# Patient Record
Sex: FEMALE | Race: WHITE | HISPANIC OR LATINO | Employment: PART TIME | URBAN - METROPOLITAN AREA
[De-identification: names, ages, dates, MRNs, and addresses within clinical notes are randomized per-mention and may not be internally consistent; named-entity substitution may affect disease eponyms.]

---

## 2017-02-06 ENCOUNTER — ALLSCRIPTS OFFICE VISIT (OUTPATIENT)
Dept: OTHER | Facility: OTHER | Age: 37
End: 2017-02-06

## 2017-06-14 ENCOUNTER — ALLSCRIPTS OFFICE VISIT (OUTPATIENT)
Dept: OTHER | Facility: OTHER | Age: 37
End: 2017-06-14

## 2017-06-14 DIAGNOSIS — N64.52 NIPPLE DISCHARGE: ICD-10-CM

## 2017-06-14 LAB — HCG, QUALITATIVE (HISTORICAL): NEGATIVE

## 2017-07-07 ENCOUNTER — LAB CONVERSION - ENCOUNTER (OUTPATIENT)
Dept: OTHER | Facility: OTHER | Age: 37
End: 2017-07-07

## 2017-07-07 LAB
PROLACTIN (HISTORICAL): 4.7 NG/ML
TSH SERPL DL<=0.05 MIU/L-ACNC: 1.14 MIU/L

## 2017-07-12 ENCOUNTER — HOSPITAL ENCOUNTER (OUTPATIENT)
Dept: ULTRASOUND IMAGING | Facility: CLINIC | Age: 37
Discharge: HOME | End: 2017-07-12
Payer: COMMERCIAL

## 2017-07-12 ENCOUNTER — APPOINTMENT (OUTPATIENT)
Dept: ULTRASOUND IMAGING | Facility: CLINIC | Age: 37
End: 2017-07-12
Payer: COMMERCIAL

## 2017-07-12 ENCOUNTER — HOSPITAL ENCOUNTER (OUTPATIENT)
Dept: MAMMOGRAPHY | Facility: CLINIC | Age: 37
Discharge: HOME | End: 2017-07-12
Payer: COMMERCIAL

## 2017-07-12 DIAGNOSIS — N64.52 NIPPLE DISCHARGE: ICD-10-CM

## 2017-07-12 PROCEDURE — 76642 ULTRASOUND BREAST LIMITED: CPT

## 2017-07-12 PROCEDURE — G0204 DX MAMMO INCL CAD BI: HCPCS

## 2017-07-12 PROCEDURE — G0279 TOMOSYNTHESIS, MAMMO: HCPCS

## 2018-01-10 NOTE — MISCELLANEOUS
Message  pc from pt c/o incision being red, spoke with nurse Ashok Nissen  She recommended for pt to go to er and have her incision evaluate  Active Problems    1  BMI 45 0-49 9, adult (V85 42) (Z68 42)   2  Encounter for routine gynecological examination (V72 31) (Z01 419)   3  Encounter for tubal ligation (V25 2) (Z30 2)   4  Preoperative exam for gynecologic surgery (V72 83) (Z01 818)   5  Screening for STD (sexually transmitted disease) (V74 5) (Z11 3)    Current Meds   1  No Reported Medications Recorded    Allergies    1   No Known Drug Allergies    Signatures   Electronically signed by : Baylee Alvarez, ; Apr 15 2016 11:38AM EST                       (Author)

## 2018-01-13 VITALS
SYSTOLIC BLOOD PRESSURE: 122 MMHG | WEIGHT: 228 LBS | DIASTOLIC BLOOD PRESSURE: 62 MMHG | HEIGHT: 64 IN | HEART RATE: 85 BPM | BODY MASS INDEX: 38.93 KG/M2

## 2018-01-13 VITALS
DIASTOLIC BLOOD PRESSURE: 62 MMHG | TEMPERATURE: 97.7 F | HEART RATE: 80 BPM | HEIGHT: 64 IN | WEIGHT: 224.43 LBS | SYSTOLIC BLOOD PRESSURE: 104 MMHG | BODY MASS INDEX: 38.32 KG/M2

## 2018-03-07 NOTE — PROGRESS NOTES
Discussion/Summary    Patient reviewed by surgical committee  Approved for bilateral tubal ligation via fulguration and removal of Implanon implant  Signatures   Electronically signed by :  JOSE Silva ; Mar 22 2016 12:09PM EST                       (Author)

## 2018-09-24 ENCOUNTER — TRANSCRIBE ORDERS (OUTPATIENT)
Dept: ADMINISTRATIVE | Facility: HOSPITAL | Age: 38
End: 2018-09-24

## 2018-09-24 DIAGNOSIS — IMO0002 LUMP: Primary | ICD-10-CM

## 2018-10-01 ENCOUNTER — HOSPITAL ENCOUNTER (OUTPATIENT)
Dept: MAMMOGRAPHY | Facility: CLINIC | Age: 38
Discharge: HOME | End: 2018-10-01
Payer: COMMERCIAL

## 2018-10-01 ENCOUNTER — TELEPHONE (OUTPATIENT)
Dept: MAMMOGRAPHY | Facility: CLINIC | Age: 38
End: 2018-10-01

## 2018-10-01 ENCOUNTER — HOSPITAL ENCOUNTER (OUTPATIENT)
Dept: ULTRASOUND IMAGING | Facility: CLINIC | Age: 38
Discharge: HOME | End: 2018-10-01
Payer: COMMERCIAL

## 2018-10-01 DIAGNOSIS — IMO0002 LUMP: ICD-10-CM

## 2018-10-01 PROCEDURE — G0279 TOMOSYNTHESIS, MAMMO: HCPCS

## 2018-10-01 PROCEDURE — 76642 ULTRASOUND BREAST LIMITED: CPT

## 2018-10-01 PROCEDURE — 77066 DX MAMMO INCL CAD BI: CPT

## 2018-10-02 DIAGNOSIS — N64.52 NIPPLE DISCHARGE: Primary | ICD-10-CM

## 2018-10-09 ENCOUNTER — OFFICE VISIT (OUTPATIENT)
Dept: OBGYN CLINIC | Facility: HOSPITAL | Age: 38
End: 2018-10-09

## 2018-10-09 VITALS
HEART RATE: 97 BPM | DIASTOLIC BLOOD PRESSURE: 85 MMHG | HEIGHT: 64 IN | BODY MASS INDEX: 38.76 KG/M2 | WEIGHT: 227 LBS | SYSTOLIC BLOOD PRESSURE: 132 MMHG

## 2018-10-09 DIAGNOSIS — Z01.419 WOMEN'S ANNUAL ROUTINE GYNECOLOGICAL EXAMINATION: Primary | ICD-10-CM

## 2018-10-09 PROBLEM — N64.52 DISCHARGE FROM NIPPLE: Status: ACTIVE | Noted: 2017-06-14

## 2018-10-09 PROCEDURE — 87624 HPV HI-RISK TYP POOLED RSLT: CPT | Performed by: NURSE PRACTITIONER

## 2018-10-09 PROCEDURE — G0145 SCR C/V CYTO,THINLAYER,RESCR: HCPCS | Performed by: NURSE PRACTITIONER

## 2018-10-09 PROCEDURE — 99395 PREV VISIT EST AGE 18-39: CPT | Performed by: NURSE PRACTITIONER

## 2018-10-09 RX ORDER — FLUTICASONE PROPIONATE 50 MCG
1-2 SPRAY, SUSPENSION (ML) NASAL DAILY
COMMUNITY
Start: 2016-04-27

## 2018-10-09 NOTE — PATIENT INSTRUCTIONS
Breast Self Exam for Women   WHAT YOU NEED TO KNOW:   What is a breast self-exam (BSE)? A BSE is a way to check your breasts for lumps and other changes  Regular BSEs can help you know how your breasts normally look and feel  Most breast lumps or changes are not cancer, but you should always have them checked by a healthcare provider  Your healthcare provider can also watch you do a BSE and can tell you if you are doing your BSE correctly  Why should I do a BSE? Breast cancer is the most common type of cancer in women  Even if you have mammograms, you may still want to do a BSE regularly  If you know how your breasts normally feel and look, it may help you know when to contact your healthcare provider  Mammograms can miss some cancers  You may find a lump during a BSE that did not show up on your mammogram   When should I do a BSE? Salty your calendar to help you remember to do BSE on a regular schedule  One easy way to remember to do a BSE is to do the exam on the same day of each month  If you have periods, you may want to do your BSE 1 week after your period ends  This is the time when your breasts may be the least swollen, lumpy, or tender  You can do regular BSEs even if you are breastfeeding or have breast implants  How should I do a BSE? · Look at your breasts in a mirror  Look at the size and shape of each breast and nipple  Check for swelling, lumps, dimpling, scaly skin, or other skin changes  Look for nipple changes, such as a nipple that is painful or beginning to pull inward  Gently squeeze both nipples and check to see if fluid (that is not breast milk) comes out of them  If you find any of these or other breast changes, contact your healthcare provider  Check your breasts while you sit or  the following 3 positions:    [facility] your arms down at your sides  ¨ Raise your hands and join them behind your head  ¨ Put firm pressure with your hands on your hips   Bend slightly forward while you look at your breasts in the mirror  · Lie down and feel your breasts  When you lie down, your breast tissue spreads out evenly over your chest  This makes it easier for you to feel for lumps and anything that may not be normal for your breasts  Do a BSE on one breast at a time  ¨ Place a small pillow or towel under your left shoulder  Put your left arm behind your head  ¨ Use the 3 middle fingers of your right hand  Use your fingertip pads, on the top of your fingers  Your fingertip pad is the most sensitive part of your finger  ¨ Use small circles to feel your breast tissue  Use your fingertip pads to make dime-sized, overlapping circles on your breast and armpits  Use light, medium, and firm pressure  First, press lightly  Second, press with medium pressure to feel a little deeper into the breast  Last, use firm pressure to feel deep within your breast     ¨ Examine your entire breast area  Examine the breast area from above the breast to below the breast where you feel only ribs  Make small circles with your fingertips, starting in the middle of your armpit  Make circles going up and down the breast area  Continue toward your breast and all the way across it  Examine the area from your armpit all the way over to the middle of your chest (breastbone)  Stop at the middle of your chest     ¨ Move the pillow or towel to your right shoulder, and put your right arm behind your head  Use the 3 fingertip pads of your left hand, and repeat the above steps to do a BSE on your right breast        What else can I do to check for breast problems or cancer? Some experts suggest that women 36years of age or older should have a mammogram every year  Other experts suggest that women between the ages of 48and 76years old should have a mammogram every 2 years  Talk to your healthcare provider about when you should have a mammogram   When should I contact my healthcare provider?    · You find any lumps or changes in your breasts  · You have breast pain or fluid coming from your nipples  · You have questions or concerns or concerns about your condition or care  CARE AGREEMENT:   You have the right to help plan your care  Learn about your health condition and how it may be treated  Discuss treatment options with your caregivers to decide what care you want to receive  You always have the right to refuse treatment  The above information is an  only  It is not intended as medical advice for individual conditions or treatments  Talk to your doctor, nurse or pharmacist before following any medical regimen to see if it is safe and effective for you  © 2017 [address] Information is for End User's use only and may not be sold, redistributed or otherwise used for commercial purposes  All illustrations and images included in CareNotes® are the copyrighted property of A D A M [employer]  or [employer] staff  Pap Smear   GENERAL INFORMATION:   What is a Pap smear? A Pap smear, or Pap test, is a procedure to check your cervix for abnormal cells  The cervix is the narrow opening at the bottom of your uterus  The cervix meets the top part of the vagina  How do I prepare for a Pap smear? The best time to schedule the test is right after your period stops  Do not have a Pap smear during your monthly period  Do not have intercourse or put anything in your vagina for 24 hours before your test    What will happen during a Pap smear? · You will lie on your back and place your feet on footrests called stirrups  Your caregiver will gently insert a device called a speculum into your vagina  The speculum is used to spread the walls of your vagina so he can see your cervix  He will use a thin brush or cotton swab to collect cells from the inside of your cervix  · Your caregiver will also collect cells from the surface of your cervix with a plastic or wooden tool called a spatula   He may also gently scrape the upper part of your vagina for a sample  The samples are placed in a container with liquid or on a glass slide  They are sent to a lab and examined for abnormal cells  How often do I need a Pap smear? Pap smears are usually done every 1 to 3 years  You may need a Pap smear more often if you have any of the following:  · Positive test result for the human papillomavirus (HPV)    · Cervical intraepithelial neoplasm or cervical cancer    · HIV    · A weak immune system    · Exposure to diethylstilbestrol (ROSAURA) medicine when your mother was pregnant with you  CARE AGREEMENT:   You have the right to help plan your care  Learn about your health condition and how it may be treated  Discuss treatment options with your caregivers to decide what care you want to receive  You always have the right to refuse treatment  The above information is an  only  It is not intended as medical advice for individual conditions or treatments  Talk to your doctor, nurse or pharmacist before following any medical regimen to see if it is safe and effective for you  © 2014 [address] is for End User's use only and may not be sold, redistributed or otherwise used for commercial purposes  All illustrations and images included in CareNotes® are the copyrighted property of [employer]  or Harshal Blackwell

## 2018-10-09 NOTE — PROGRESS NOTES
Yari Drummond is a 45 y o  female who presents for annual well woman exam  Last Pap smear 14  Resulted NILM/ HR HPV  Negative  Due for Pap smear  Today  Last mammogram/ ultrasound 10/1/18  With recommendations for MRI and clinical management given palpable masses ( described by patient as small, deep behind nipple only palpable at certain times during the month) , breast discharge- occurs monthly prior to menses small amount brown/red liquid bilaterally and occasional pain  Patient was referred to Dr Rina Castillo; has an appointment next week  Periods are regular every 28-30 days, lasting 3-4 days  No intermenstrual bleeding, spotting, or discharge  Current contraception: tubal ligation  History of abnormal Pap smear: no  Family history of uterine or ovarian cancer: no  Regular self breast exam: yes  History of abnormal mammogram: yes -   Has follow-up with Dr Rina Castillo scheduled next week Family history of breast cancer: no  History of abnormal lipids: no  Menstrual History:  OB History      Para Term  AB Living    4 3 3   1 3    SAB TAB Ectopic Multiple Live Births                      Menarche age: 5  Patient's last menstrual period was 2018 (exact date)  Period Cycle (Days):  (monthly )  Period Duration (Days): 3-4  Period Pattern: Regular  Menstrual Flow: Heavy  Dysmenorrhea: (!) Mild  Dysmenorrhea Symptoms: Cramping    The following portions of the patient's history were reviewed and updated as appropriate: allergies, current medications, past family history, past medical history, past social history, past surgical history and problem list     Review of Systems  Pertinent items are noted in HPI        Objective      /85 (BP Location: Right arm, Patient Position: Sitting, Cuff Size: Large)   Pulse 97   Ht 5' 4" (1 626 m)   Wt 103 kg (227 lb)   LMP 2018 (Exact Date)   BMI 38 96 kg/m²     General:   alert and oriented, in no acute distress, alert, cooperative, appears stated age and cooperative   Heart: regular rate and rhythm, S1, S2 normal, no murmur, click, rub or gallop   Lungs: clear to auscultation bilaterally   Abdomen: soft, non-tender, without masses or organomegaly, normal bowel sounds and nontender   Vulva: normal   Vagina: normal mucosa, normal discharge, no palpable nodules   Cervix: no bleeding following Pap, no cervical motion tenderness and no lesions   Uterus: normal size, non-tender, normal shape and consistency   Adnexa: normal adnexa and no mass, fullness, tenderness    breast exam: no palpable masses, unable to elicit nipple discharge, nontender no skin changes bilaterally          Assessment      @well woman@   Plan      All questions answered  Await pap smear results  Breast self exam technique reviewed and patient encouraged to perform self-exam monthly  Contraception: tubal ligation  Diagnosis explained in detail, including differential   Dietary diary  Discussed healthy lifestyle modifications  Educational material distributed  Follow up in 1 year annual exam    Follow up as needed  Thin prep Pap smear      declines flu vaccine   appointment with Dr Samina Lau

## 2018-10-11 LAB
HPV HR 12 DNA CVX QL NAA+PROBE: NEGATIVE
HPV16 DNA CVX QL NAA+PROBE: NEGATIVE
HPV18 DNA CVX QL NAA+PROBE: NEGATIVE

## 2018-10-12 LAB
LAB AP GYN PRIMARY INTERPRETATION: NORMAL
Lab: NORMAL

## 2018-10-15 ENCOUNTER — OFFICE VISIT (OUTPATIENT)
Dept: SURGICAL ONCOLOGY | Facility: CLINIC | Age: 38
End: 2018-10-15

## 2018-10-15 VITALS
SYSTOLIC BLOOD PRESSURE: 100 MMHG | DIASTOLIC BLOOD PRESSURE: 70 MMHG | RESPIRATION RATE: 16 BRPM | TEMPERATURE: 98.8 F | WEIGHT: 229 LBS | HEIGHT: 64 IN | HEART RATE: 94 BPM | BODY MASS INDEX: 39.09 KG/M2

## 2018-10-15 DIAGNOSIS — N64.52 DISCHARGE FROM NIPPLE: Primary | ICD-10-CM

## 2018-10-15 DIAGNOSIS — N64.52 NIPPLE DISCHARGE: ICD-10-CM

## 2018-10-15 PROCEDURE — 99244 OFF/OP CNSLTJ NEW/EST MOD 40: CPT | Performed by: NURSE PRACTITIONER

## 2018-10-15 NOTE — PROGRESS NOTES
Surgical Oncology Follow Up       [facility]  [facility] [address]  1980  [id number]      Chief Complaint   Patient presents with    Breast Problem     Pt is here for nipple discharge        Assessment/Plan:  1  Nipple discharge  - Ambulatory referral to Surgical Oncology  - TSH, 3rd generation  - T3  - T4, free  - Prolactin  - 3 mo f/u - Stop compression of breasts  Take Ibuprofen for breast discomfort prior to menses  Discussion/Summary:  Patient is a 71-year-old female who presents today for recommendations for bilateral nipple discharge  This seems to be physiologic in nature  There are no worrisome findings on her physical exam and there is no abnormal findings on bilateral mammogram and bilateral u/s  I was able to express a clear yellow discharge from multiple ducts of right breast and one duct of the left breast   I will check patient's thyroid labs and prolactin level  I will call the patient with the results  Per NCCN guidelines, I have recommended patient stop compressing her breasts  I will see her back in 3 months for follow up  If labs are normal and patient continues to experience nipple discharge, an MRI may be considered at that time  I've instructed her to call with any changes on self breast exam, spontaneous discharge, bloody discharge  She is in agreement with this plan  All of her questions were answered  Case discussed with Dr Jaffe  History of Present Illness:     -Interval History:  Patient is a 71-year-old female who presents today for a new consult for bilateral nipple discharge  She had a bilateral 3D diagnostic mammogram performed on October 1, 2018 which was BI-RADS 2  There are no dominant masses, architectural distortion or suspicious calcifications    A targeted ultrasound was performed of bilateral breast which was also BI-RADS 2   TC lifetime risk is 12 8%, NCI lifetime risk is 12 2%  A breast MRI was recommended due to palpable abnormalities as well as bilateral nipple discharge  She presents today for further recommendations  Upon further discussion, patient reports that she only experiences bilateral nipple discharge after compression of the breasts  She states that she only does this a few days before her menses because she reports fullness and she feels if she doesn't release "even a drop of fluid from each breast" she will have intense itching of the breasts  She reports that the discharge is a clear yellow fluid and sometimes a clear brown fluid  It is not bloody  She states the discharge is never spontaneous  She also reports that she has had a right breast lump at the 12-1:00 position of the right breast under the areolar tissue that has been present for several years  She states this is unchanged  She can appreciate a similar lump under the left breast areola- this is also stable  Menarche age 5  For pregnancies, 3 live births  Age 32 at the time her 1st child was born  She has used Norplant as birth control in the past, now s/p tubal ligation  She has never used hormone replacement  Only significant family history is a mother with skin cancer (pt unsure what kind) diagnosed at age 72  She is not of Ashkenazi [employer] descent  She does not smoke and does not drink  Review of Systems:  Review of Systems   Constitutional: Negative for activity change, appetite change, chills, fatigue, fever and unexpected weight change  HENT: Negative for trouble swallowing  Eyes: Negative for pain, redness and visual disturbance  Respiratory: Negative for cough, shortness of breath and wheezing  Cardiovascular: Negative for chest pain, palpitations and leg swelling  Gastrointestinal: Negative for abdominal pain, constipation, diarrhea, nausea and vomiting  Endocrine: Negative for cold intolerance and heat intolerance  Musculoskeletal: Positive for arthralgias  Negative for back pain, gait problem and myalgias  Skin: Negative for color change and rash  Neurological: Positive for headaches  Negative for dizziness, syncope, light-headedness and numbness  Hematological: Negative for adenopathy  Psychiatric/Behavioral: Negative for agitation and confusion  All other systems reviewed and are negative  Patient Active Problem List   Diagnosis    Encounter for sterilization    Multiparity    Discharge from nipple    Morbid obesity ([facility] )    Obstructive sleep apnea     Past Medical History:   Diagnosis Date    Breast lump     Disease of thyroid gland     Migraine     Morbid obesity with body mass index of 45 0-49 9 in adult ([facility] )     Nephrolithiasis     Sleep apnea      Past Surgical History:   Procedure Laterality Date    BARIATRIC SURGERY      lap band    INSERTION OF CONTRACEPTIVE CAPSULE      LAPAROSCOPY N/A 4/5/2016    Procedure: BILATERAL LAPAROSCOPIC TUBAL LIGATION; REMOVAL OF IMPLANON FROM LEFT ARM ;  Surgeon: Olivia Richardson MD;  Location: [facility];  Service:     LITHOTRIPSY  2005    TONSILLECTOMY  2000    TUBAL LIGATION       Family History   Problem Relation Age of Onset    Skin cancer Mother     Hypertension Mother     Hypertension Father      Social History     Social History    Marital status: /Civil Union     Spouse name: N/A    Number of children: N/A    Years of education: N/A     Occupational History    Not on file       Social History Main Topics    Smoking status: Never Smoker    Smokeless tobacco: Never Used    Alcohol use No    Drug use: No    Sexual activity: Yes     Partners: Male     Birth control/ protection: Female Sterilization     Other Topics Concern    Not on file     Social History Narrative    No narrative on file       Current Outpatient Prescriptions:     fluticasone (FLONASE) 50 mcg/act nasal spray, 1-2 sprays into each nostril daily, Disp: , Rfl:     Melatonin 1 MG CAPS, Take 1 mg by mouth, Disp: , Rfl:   No Known Allergies  Vitals:    10/15/18 1258   BP: 100/70   Pulse: 94   Resp: 16   Temp: 98 8 °F (37 1 °C)       Physical Exam   Constitutional: She is oriented to person, place, and time  Vital signs are normal  She appears well-developed and well-nourished  No distress  HENT:   Head: Normocephalic and atraumatic  Neck: Normal range of motion  Cardiovascular: Normal rate, regular rhythm and normal heart sounds  Pulmonary/Chest: Effort normal and breath sounds normal    Bilateral breasts were examined in the sitting and supine position  There are no dominant masses, skin nodules, nipple changes  I believe the "palpable lump" patient reports at 12:00 position of right breast behind areolar tissue is normal breast tissue - there is no dominant mass  With compression, clear, yellow multi duct discharge noted from right nipple  Clear yellow discharge from single duct (12:00) of left breast with compression  There is no bilateral supraclavicular or axillary lymphadenopathy noted  Abdominal: Soft  Normal appearance  She exhibits no mass  There is no hepatosplenomegaly  There is no tenderness  Musculoskeletal: Normal range of motion  Lymphadenopathy:     She has no axillary adenopathy  Right: No supraclavicular adenopathy present  Left: No supraclavicular adenopathy present  Neurological: She is alert and oriented to person, place, and time  Skin: Skin is warm, dry and intact  No rash noted  She is not diaphoretic  Psychiatric: She has a normal mood and affect  Her speech is normal    Vitals reviewed  Results:    Imaging  Mammo Diagnostic Bilateral W 3d & Cad    Result Date: 10/1/2018  Narrative: Patient History: Patient has never smoked  Patient's BMI is 39 1  Reason for exam: clinical finding  Mammo Diagnostic Bilateral W DBT and CAD: October 1, 2018 - Check In #: [de-identified] 2D/3D Procedure 3D Bilateral CC and MLO view(s) were taken   2D Bilateral CC and MLO view(s) were taken  Technologist: Guille Levy RT(R)(M) Prior study comparison: July 12, 2017, mammo diagnostic bilateral W DBT and CAD performed at [address] [place] [address]  These images were obtained using digital technique and with the assistance of Computer Aided Detection  There are no dominant masses, foci of architectural distortion or suspicious clusters of calcification to suggest malignancy  The visualized skin appears normal  Targeted ultrasound demonstrates no evidence of hypoechoic mass or architectural distortion to suggest malignancy  As the breast tissue is heterogeneously dense, combination of palpable abnormalities and discharge may prompt the need for MRI  US Breast Bilateral Limited (Diagnostic): October 1, 2018 - Check In #: [de-identified] Standard views  Technologist: Jessica Ruiz RDMS IMPRESSION ACR BI-RADS® Assessments: BiRad:2 - Benign (Overall) Diag Mammo: BiRad:2 - benign finding  US Breast Bilat: BiRad:2 - benign finding in both breasts  Recommendation: Breast MRI and clinical management of both breasts  Routine screening mammogram of both breasts in 1 year  Transcription Location: [facility]: [id number] Risk Value(s): Tyrer-Cuzick 10 Year: 1 300%, Tyrer-Cuzick Lifetime: 12 800%, Myriad Table: 1 5%, GIANNA 5 Year: 0 6%, NCI Lifetime: 12 2%    Us Breast Bilateral Limited (diagnostic)    Result Date: 10/1/2018  Narrative: Patient History: Patient has never smoked  Patient's BMI is 39 1  Reason for exam: clinical finding  Mammo Diagnostic Bilateral W DBT and CAD: October 1, 2018 - Check In #: [de-identified] 2D/3D Procedure 3D Bilateral CC and MLO view(s) were taken  2D Bilateral CC and MLO view(s) were taken  Technologist: Guille Levy, RT(R)(M) Prior study comparison: July 12, 2017, mammo diagnostic bilateral W DBT and CAD performed at [address] [place] [address]  These images were obtained using digital technique and with the assistance of Computer Aided Detection    There are no dominant masses, foci of architectural distortion or suspicious clusters of calcification to suggest malignancy  The visualized skin appears normal  Targeted ultrasound demonstrates no evidence of hypoechoic mass or architectural distortion to suggest malignancy  As the breast tissue is heterogeneously dense, combination of palpable abnormalities and discharge may prompt the need for MRI  US Breast Bilateral Limited (Diagnostic): October 1, 2018 - Check In #: [de-identified] Standard views  Technologist: Alphonse Fiore RDMS IMPRESSION ACR BI-RADS® Assessments: BiRad:2 - Benign (Overall) Diag Mammo: BiRad:2 - benign finding  US Breast Bilat: BiRad:2 - benign finding in both breasts  Recommendation: Breast MRI and clinical management of both breasts  Routine screening mammogram of both breasts in 1 year  Transcription Location: [address] [place], [place] [address]way: [id number] Risk Value(s): Tyrer-Cuzick 10 Year: 1 300%, Tyrer-Cuzick Lifetime: 12 800%, Myriad Table: 1 5%, GIANNA 5 Year: 0 6%, NCI Lifetime: 12 2%      I reviewed the above imaging data  Advance Care Planning/Advance Directives:  Discussed disease status and treatment goals with the patient

## 2018-11-30 ENCOUNTER — HOSPITAL ENCOUNTER (EMERGENCY)
Facility: HOSPITAL | Age: 38
Discharge: HOME | End: 2018-11-30
Attending: EMERGENCY MEDICINE | Admitting: EMERGENCY MEDICINE
Payer: COMMERCIAL

## 2018-11-30 VITALS
HEART RATE: 66 BPM | SYSTOLIC BLOOD PRESSURE: 129 MMHG | RESPIRATION RATE: 14 BRPM | TEMPERATURE: 97.9 F | BODY MASS INDEX: 39.36 KG/M2 | DIASTOLIC BLOOD PRESSURE: 81 MMHG | OXYGEN SATURATION: 100 % | WEIGHT: 229.28 LBS

## 2018-11-30 DIAGNOSIS — H81.10 BPPV (BENIGN PAROXYSMAL POSITIONAL VERTIGO): ICD-10-CM

## 2018-11-30 DIAGNOSIS — G43.909 MIGRAINE: Primary | ICD-10-CM

## 2018-11-30 LAB
BACTERIA UR QL AUTO: NORMAL /HPF
BILIRUB UR QL STRIP: NEGATIVE
CLARITY UR: CLEAR
COLOR UR: YELLOW
EXT PREG TEST URINE: NEGATIVE
GLUCOSE UR STRIP-MCNC: NEGATIVE MG/DL
HGB UR QL STRIP.AUTO: ABNORMAL
KETONES UR STRIP-MCNC: NEGATIVE MG/DL
LEUKOCYTE ESTERASE UR QL STRIP: ABNORMAL
NITRITE UR QL STRIP: NEGATIVE
NON-SQ EPI CELLS URNS QL MICRO: NORMAL /HPF
PH UR STRIP.AUTO: 7 [PH] (ref 4.5–8)
PROT UR STRIP-MCNC: NEGATIVE MG/DL
RBC #/AREA URNS AUTO: NORMAL /HPF
SP GR UR STRIP.AUTO: 1.01 (ref 1–1.03)
UROBILINOGEN UR QL STRIP.AUTO: 0.2 E.U./DL
WBC #/AREA URNS AUTO: NORMAL /HPF

## 2018-11-30 PROCEDURE — 99283 EMERGENCY DEPT VISIT LOW MDM: CPT

## 2018-11-30 PROCEDURE — 81001 URINALYSIS AUTO W/SCOPE: CPT

## 2018-11-30 PROCEDURE — 96372 THER/PROPH/DIAG INJ SC/IM: CPT

## 2018-11-30 PROCEDURE — 96374 THER/PROPH/DIAG INJ IV PUSH: CPT

## 2018-11-30 PROCEDURE — 96375 TX/PRO/DX INJ NEW DRUG ADDON: CPT

## 2018-11-30 PROCEDURE — 81025 URINE PREGNANCY TEST: CPT | Performed by: EMERGENCY MEDICINE

## 2018-11-30 RX ORDER — DIPHENHYDRAMINE HYDROCHLORIDE 50 MG/ML
25 INJECTION INTRAMUSCULAR; INTRAVENOUS ONCE
Status: COMPLETED | OUTPATIENT
Start: 2018-11-30 | End: 2018-11-30

## 2018-11-30 RX ORDER — KETOROLAC TROMETHAMINE 30 MG/ML
15 INJECTION, SOLUTION INTRAMUSCULAR; INTRAVENOUS ONCE
Status: COMPLETED | OUTPATIENT
Start: 2018-11-30 | End: 2018-11-30

## 2018-11-30 RX ORDER — METOCLOPRAMIDE HYDROCHLORIDE 5 MG/ML
10 INJECTION INTRAMUSCULAR; INTRAVENOUS ONCE
Status: COMPLETED | OUTPATIENT
Start: 2018-11-30 | End: 2018-11-30

## 2018-11-30 RX ORDER — MECLIZINE HCL 12.5 MG/1
12.5 TABLET ORAL 3 TIMES DAILY PRN
Qty: 12 TABLET | Refills: 0 | Status: SHIPPED | OUTPATIENT
Start: 2018-11-30

## 2018-11-30 RX ADMIN — KETOROLAC TROMETHAMINE 15 MG: 30 INJECTION, SOLUTION INTRAMUSCULAR at 12:54

## 2018-11-30 RX ADMIN — DIPHENHYDRAMINE HYDROCHLORIDE 25 MG: 50 INJECTION, SOLUTION INTRAMUSCULAR; INTRAVENOUS at 12:47

## 2018-11-30 RX ADMIN — METOCLOPRAMIDE 10 MG: 5 INJECTION, SOLUTION INTRAMUSCULAR; INTRAVENOUS at 12:52

## 2018-11-30 NOTE — DISCHARGE INSTRUCTIONS
Return immediately with any fevers any new or worsening headache any difficulty walking any vision changes or any other concerning symptoms  Continue with Tylenol and ibuprofen for any mild to moderate discomfort as well the medication provided today for any dizziness sensation  Increased fluid intake over the next 48 hr     Migraine Headache   WHAT YOU NEED TO KNOW:   A migraine is a severe headache  The pain can be so severe that it interferes with your daily activities  A migraine can last a few hours up to several days  The exact cause of migraines is not known  DISCHARGE INSTRUCTIONS:   Return to the emergency department if:   · You have a headache that seems different or much worse than your usual migraine headache      · You have a severe headache with a fever or a stiff neck       · You have new problems with speech, vision, balance, or movement      · You feel like you are going to faint, you become confused, or you have a seizure  Contact your healthcare provider or neurologist if:   · Your migraines interfere with your daily activities       · Your medicines or treatments stop working      · You have questions or concerns about your condition or care  Medicines: You may need any of the following  Take medicine as soon as you feel a migraine begin  · Prescription pain medicine may be given  Do not wait until the pain is severe before you take your medicine       · Migraine medicines are used to help prevent a migraine or stop it once it starts       · Antinausea medicine may be given to calm your stomach and to help prevent vomiting  This medicine can also help relieve pain      · Take your medicine as directed  Contact your healthcare provider if you think your medicine is not helping or if you have side effects  Tell him or her if you are allergic to any medicine  Keep a list of the medicines, vitamins, and herbs you take  Include the amounts, and when and why you take them   Bring the list or the pill bottles to follow-up visits  Carry your medicine list with you in case of an emergency  Manage your symptoms:   · Rest in a dark, quiet room  This will help decrease your pain  Sleep may also help relieve the pain      · Apply ice to decrease pain  Use an ice pack, or put crushed ice in a plastic bag  Cover the ice pack with a towel and place it on your head  Apply ice for 15 to 20 minutes every hour      · Apply heat to decrease pain and muscle spasms  Use a small towel dampened with warm water or a heating pad, or sit in a warm bath  Apply heat on the area for 20 to 30 minutes every 2 hours  You may alternate heat and ice      · Keep a migraine record  Write down when your migraines start and stop  Include your symptoms and what you were doing when a migraine began  Record what you ate or drank for 24 hours before the migraine started  Keep track of what you did to treat your migraine and if it worked  Bring the migraine record with you to visits with your healthcare provider  Follow up with your healthcare provider or neurologist as directed: Bring your migraine record with you  Write down your questions so you remember to ask them during your visits  Prevent another migraine:   · Do not smoke  Nicotine and other chemicals in cigarettes and cigars can trigger a migraine or make it worse  Ask your healthcare provider for information if you currently smoke and need help to quit  E-cigarettes or smokeless tobacco still contain nicotine  Talk to your healthcare provider before you use these products       · Do not drink alcohol  Alcohol can trigger a migraine  It can also keep medicines used to treat your migraines from working      · Get regular exercise  Exercise may help prevent migraines  Talk to your healthcare provider about the best exercise plan for you  Try to get at least 30 minutes of exercise on most days      · Manage stress  Stress may trigger a migraine   Learn new ways to relax, such as deep breathing      · Create a sleep schedule  Go to bed and get up at the same times each day  Do not watch television before bed      · Eat regular meals  Include healthy foods such as include fruit, vegetables, whole-grain breads, low-fat dairy products, beans, lean meat, and fish  Do not have food or drinks that trigger your migraines  © 2017 [address] [employer] Information is for End User's use only and may not be sold, redistributed or otherwise used for commercial purposes  All illustrations and images included in CareNotes® are the copyrighted property of [employer]  or [employer]  The above information is an  only  It is not intended as medical advice for individual conditions or treatments   Talk to your doctor, nurse or pharmacist before following any medical regimen to see if it is safe and effective for you

## 2018-11-30 NOTE — ED ATTENDING ATTESTATION
Martinez Lopez MD, saw and evaluated the patient  I have discussed the patient with the resident/non-physician practitioner and agree with the resident's/non-physician practitioner's findings, Plan of Care, and MDM as documented in the resident's/non-physician practitioner's note, except where noted  All available labs and Radiology studies were reviewed  At this point I agree with the current assessment done in the Emergency Department  I have conducted an independent evaluation of this patient including a focused history and a physical exam     51-year-old female, history of migraine headaches, presenting to the emergency department for evaluation of bilateral frontal headache, gradual in onset, started last night  Additionally patient has had approximately 1 weeks worth of vertiginous type dizziness  No chest pain, cough, shortness of breath, abdominal pain  Positive nausea  No vomiting or diarrhea  Ten systems reviewed negative except as noted in the history of present illness  The patient is resting comfortably on a stretcher in no acute respiratory distress  The patient appears nontoxic  HEENT reveals moist mucous membranes  Head is normocephalic and atraumatic  Conjunctiva and sclera are normal  Neck is nontender and supple with full range of motion to flexion, extension, lateral rotation  No meningismus appreciated  No masses are appreciated  Lungs are clear to auscultation bilaterally without any wheezes, rales or rhonchi  Heart is regular rate and rhythm without any murmurs, rubs or gallops  Abdomen is soft and nontender without any rebound or guarding  Extremities appear grossly normal without any significant arthropathy  Patient is awake, alert, and oriented x3  The patient has normal interaction  GCS 15  Cranial nerves 2-12 are intact  Motor is 5 out of 5 bilateral upper lower extremities  Sensation intact      Assessment and plan:  51-year-old female presenting to the emergency department for evaluation of headache which likely represents migraine  Plan is medical management and re-evaluation

## 2018-12-01 NOTE — ED PROVIDER NOTES
History  Chief Complaint   Patient presents with    Headache     Pt states that she has been dizzy for a couple days but started with a HA last night  66-year-old female with no significant past medical history presents for evaluation of a headache  Left-sided throbbing sensation present for 1 5 days  Continued this morning the so she came into the ED for evaluation  No associated nausea or vomiting  No diplopia or blurring of vision  No neck pain  No recent trauma or falls  Has not tried any medication for her pain  Reports a long history of migraines today is similar location but more severe  No relation to her menstrual cycle  LMP was 1 month ago  No focal numbness tingling or weakness  On review systems she reports having 1 week of dizziness described as lightheadedness and occasional vertigo  Describes a intermittent ribs spinning sensation when she moves her head quickly  She has not had this before  Has not tried any medication for this  She denies any fever cough or recent viral illness  No sinus symptoms  Prior to Admission Medications   Prescriptions Last Dose Informant Patient Reported? Taking?    Melatonin 1 MG CAPS Not Taking at Unknown time Self Yes No   Sig: Take 1 mg by mouth   fluticasone (FLONASE) 50 mcg/act nasal spray Not Taking at Unknown time Self Yes No   Si-2 sprays into each nostril daily      Facility-Administered Medications: None       Past Medical History:   Diagnosis Date    Breast lump     Disease of thyroid gland     Migraine     Morbid obesity with body mass index of 45 0-49 9 in adult ([facility] [address] )     Nephrolithiasis     Sleep apnea        Past Surgical History:   Procedure Laterality Date    BARIATRIC SURGERY      lap band    INSERTION OF CONTRACEPTIVE CAPSULE      LAPAROSCOPY N/A 2016    Procedure: BILATERAL LAPAROSCOPIC TUBAL LIGATION; REMOVAL OF IMPLANON FROM LEFT ARM ;  Surgeon: Matti Sue MD;  Location: [facility];  Service:    Valerio Watson LITHOTRIPSY  2005    TONSILLECTOMY  2000    TUBAL LIGATION         Family History   Problem Relation Age of Onset    Skin cancer Mother     Hypertension Mother     Hypertension Father      I have reviewed and agree with the history as documented  Social History   Substance Use Topics    Smoking status: Never Smoker    Smokeless tobacco: Never Used    Alcohol use No        Review of Systems   Constitutional: Negative for chills, fatigue and fever  HENT: Negative for congestion, ear pain, postnasal drip, rhinorrhea, sinus pressure and trouble swallowing  Eyes: Negative for photophobia, pain and visual disturbance  Respiratory: Negative for cough, chest tightness, shortness of breath and wheezing  Cardiovascular: Negative for chest pain and palpitations  Gastrointestinal: Negative for abdominal distention, abdominal pain, constipation, diarrhea and nausea  Genitourinary: Negative for dysuria, hematuria and urgency  Musculoskeletal: Negative for back pain, myalgias and neck stiffness  Skin: Negative for rash  Neurological: Positive for headaches  Negative for dizziness, weakness, light-headedness and numbness  Physical Exam  ED Triage Vitals [11/30/18 1106]   Temperature Pulse Respirations Blood Pressure SpO2   97 9 °F (36 6 °C) 60 18 128/75 99 %      Temp Source Heart Rate Source Patient Position - Orthostatic VS BP Location FiO2 (%)   Oral Monitor Sitting Left arm --      Pain Score       9           Orthostatic Vital Signs  Vitals:    11/30/18 1106 11/30/18 1338   BP: 128/75 129/81   Pulse: 60 66   Patient Position - Orthostatic VS: Sitting Lying       Physical Exam   Constitutional: She is oriented to person, place, and time  Vital signs are normal  She appears well-developed and well-nourished  She does not appear ill  No distress  HENT:   Head: Normocephalic and atraumatic  Head is without abrasion and without contusion     Right Ear: Tympanic membrane normal    Left Ear: Tympanic membrane normal    Nose: Nose normal    Mouth/Throat: Uvula is midline, oropharynx is clear and moist and mucous membranes are normal    Eyes: Pupils are equal, round, and reactive to light  Conjunctivae and EOM are normal    Fundoscopic exam:       The right eye shows no hemorrhage and no papilledema  The left eye shows no hemorrhage and no papilledema  Neck: Trachea normal, normal range of motion, full passive range of motion without pain and phonation normal  Neck supple  No JVD present  No spinous process tenderness and no muscular tenderness present  Carotid bruit is not present  Normal range of motion present  No thyromegaly present  Cardiovascular: Normal rate, regular rhythm and intact distal pulses  Exam reveals no friction rub  No murmur heard  Pulmonary/Chest: Effort normal and breath sounds normal  No accessory muscle usage or stridor  No tachypnea  No respiratory distress  She has no decreased breath sounds  She has no wheezes  She has no rhonchi  She has no rales  She exhibits no tenderness, no crepitus, no edema and no retraction  Abdominal: Soft  Normal appearance and bowel sounds are normal  She exhibits no distension  There is no tenderness  There is no rigidity, no rebound, no guarding and no CVA tenderness  Musculoskeletal: Normal range of motion  Lymphadenopathy:     She has no cervical adenopathy  Neurological: She is alert and oriented to person, place, and time  She has normal strength  She is not disoriented  She displays no tremor  No cranial nerve deficit or sensory deficit  She exhibits normal muscle tone  GCS eye subscore is 4  GCS verbal subscore is 5  GCS motor subscore is 6  Unremarkable cranial nerve exam  Pupils 4 mm equal round reactive to light  No nystagmus, normal extraocular motion  5 out of 5 upper and lower extremity strength  No subjective sensory deficits to the face, upper or lower extremity  Normal finger-nose and heel shin        Skin: Skin is warm, dry and intact  No rash noted  She is not diaphoretic  Psychiatric: She has a normal mood and affect  Nursing note and vitals reviewed  ED Medications  Medications   metoclopramide (REGLAN) injection 10 mg (10 mg Intravenous Given 11/30/18 1252)   ketorolac (TORADOL) injection 15 mg (15 mg Intramuscular Given 11/30/18 1254)   diphenhydrAMINE (BENADRYL) injection 25 mg (25 mg Intravenous Given 11/30/18 1247)       Diagnostic Studies  Results Reviewed     Procedure Component Value Units Date/Time    Urine Microscopic [[id number]]  (Normal) Collected:  11/30/18 1235    Lab Status:  Final result Specimen:  Urine from Urine, Other Updated:  11/30/18 1502     RBC, UA None Seen /hpf      WBC, UA None Seen /hpf      Epithelial Cells Occasional /hpf      Bacteria, UA None Seen /hpf     POCT pregnancy, urine [[id number]]  (Normal) Resulted:  11/30/18 1233    Lab Status:  Final result Updated:  11/30/18 1245     EXT PREG TEST UR (Ref: Negative) negative    ED Urine Macroscopic [[id number]]  (Abnormal) Collected:  11/30/18 1235    Lab Status:  Final result Specimen:  Urine Updated:  11/30/18 1233     Color, UA Yellow     Clarity, UA Clear     pH, UA 7 0     Leukocytes, UA Small (A)     Nitrite, UA Negative     Protein, UA Negative mg/dl      Glucose, UA Negative mg/dl      Ketones, UA Negative mg/dl      Urobilinogen, UA 0 2 E U /dl      Bilirubin, UA Negative     Blood, UA Moderate (A)     Specific [place], UA 1 010    Narrative:       CLINITEK RESULT                 No orders to display         Procedures  Procedures      Phone Consults  ED Phone Contact    ED Course        patient is sleeping upon reassessment  Awakens easily to voice  patient reports complete resolution of headache following migraine cocktail  Discussed return precautions  Provided treatment for BPPV                          MDM  CritCare Time    Disposition  Final diagnoses:   Migraine   BPPV (benign paroxysmal positional vertigo) Time reflects when diagnosis was documented in both MDM as applicable and the Disposition within this note     Time User Action Codes Description Comment    11/30/2018  2:02 PM Paddy Nails Add [G43 909] Migraine     11/30/2018  2:03 PM Eder Castaneda Add [H81 10] BPPV (benign paroxysmal positional vertigo)       ED Disposition     ED Disposition Condition Comment    Discharge  [facility] discharge to home/self care  Condition at discharge: Good        Follow-up Information     Follow up With Specialties Details Why Contact Info Additional [address] Emergency Department Emergency Medicine Go to If symptoms worsen [address]  [phone]  ED, [address] [place] [address], [id number]          Discharge Medication List as of 11/30/2018  2:04 PM      START taking these medications    Details   meclizine (ANTIVERT) 12 5 MG tablet Take 1 tablet (12 5 mg total) by mouth 3 (three) times a day as needed for dizziness, Starting Fri 11/30/2018, Print         CONTINUE these medications which have NOT CHANGED    Details   fluticasone (FLONASE) 50 mcg/act nasal spray 1-2 sprays into each nostril daily, Starting Wed 4/27/2016, Historical Med      Melatonin 1 MG CAPS Take 1 mg by mouth, Historical Med           No discharge procedures on file  ED Provider  Attending physically available and evaluated [facility]  I managed the patient along with the ED Attending      Electronically Signed by         Nayan Burk,   12/01/18 [address] [facility],   12/01/18 8835

## 2019-01-16 ENCOUNTER — TRANSCRIBE ORDERS (OUTPATIENT)
Dept: LAB | Facility: HOSPITAL | Age: 39
End: 2019-01-16

## 2019-01-16 ENCOUNTER — APPOINTMENT (OUTPATIENT)
Dept: LAB | Facility: HOSPITAL | Age: 39
End: 2019-01-16
Payer: COMMERCIAL

## 2019-01-16 LAB
PROLACTIN SERPL-MCNC: 5.6 NG/ML
T3 SERPL-MCNC: 1.1 NG/ML (ref 0.6–1.8)
T4 FREE SERPL-MCNC: 0.95 NG/DL (ref 0.76–1.46)
TSH SERPL DL<=0.05 MIU/L-ACNC: 1.05 UIU/ML (ref 0.36–3.74)

## 2019-01-16 PROCEDURE — 84146 ASSAY OF PROLACTIN: CPT | Performed by: NURSE PRACTITIONER

## 2019-01-16 PROCEDURE — 84439 ASSAY OF FREE THYROXINE: CPT | Performed by: NURSE PRACTITIONER

## 2019-01-16 PROCEDURE — 84443 ASSAY THYROID STIM HORMONE: CPT | Performed by: NURSE PRACTITIONER

## 2019-01-16 PROCEDURE — 84480 ASSAY TRIIODOTHYRONINE (T3): CPT | Performed by: NURSE PRACTITIONER

## 2019-01-16 PROCEDURE — 36415 COLL VENOUS BLD VENIPUNCTURE: CPT | Performed by: NURSE PRACTITIONER

## 2019-10-15 ENCOUNTER — ANNUAL EXAM (OUTPATIENT)
Dept: OBGYN CLINIC | Facility: CLINIC | Age: 39
End: 2019-10-15
Payer: COMMERCIAL

## 2019-10-15 VITALS
DIASTOLIC BLOOD PRESSURE: 80 MMHG | SYSTOLIC BLOOD PRESSURE: 120 MMHG | BODY MASS INDEX: 39.27 KG/M2 | WEIGHT: 230 LBS | HEIGHT: 64 IN

## 2019-10-15 DIAGNOSIS — Z01.419 WOMEN'S ANNUAL ROUTINE GYNECOLOGICAL EXAMINATION: Primary | ICD-10-CM

## 2019-10-15 DIAGNOSIS — N39.3 STRESS INCONTINENCE OF URINE: ICD-10-CM

## 2019-10-15 PROCEDURE — 99395 PREV VISIT EST AGE 18-39: CPT | Performed by: NURSE PRACTITIONER

## 2019-10-15 NOTE — PROGRESS NOTES
Subjective    HPI:     Fadi Love is a 44 y o  female  She is a  4 Para 3, with 3 prior vaginal deliveries  Her menstrual cycles are regular and predictable  Her current method of contraception includes tubal ligation  She denies issues with intimacy  She complains of urinary incontinence, states she has to wear a pad because she feels that the urine just comes out  Denies GI and Gyn complaints  She denies depression/anxiety  Medical, surgical and family history reviewed  Her dental care is up-to-date  Gynecologic History    Patient's last menstrual period was 2019  Last Pap: 10/9/18  Results were: normal      Obstetric History    OB History    Para Term  AB Living   4 3 3   1 3   SAB TAB Ectopic Multiple Live Births                  # Outcome Date GA Lbr Dontrell/2nd Weight Sex Delivery Anes PTL Lv   4 AB            3 Term            2 Term            1 Term                The following portions of the patient's history were reviewed and updated as appropriate: allergies, current medications, past family history, past medical history, past social history, past surgical history and problem list     Review of Systems    Pertinent items are noted in HPI  Objective    Physical Exam   Constitutional: Vital signs are normal  She appears well-developed and well-nourished  Genitourinary: Vagina normal and uterus normal  Pelvic exam was performed with patient supine  There is no rash, tenderness, lesion or Bartholin's cyst on the right labia  There is no rash, tenderness, lesion or Bartholin's cyst on the left labia  Right adnexum non-palpable (due to abdominal girth)  Right adnexum does not display mass, does not display tenderness and does not display fullness  Left adnexum non-palpable (due to abdominal girth  )  Left adnexum does not display mass, does not display tenderness and does not display fullness  Cervix is parous   Cervix does not exhibit motion tenderness, lesion, discharge, friability, polyp or nabothian cyst      Uterus is anteverted  Genitourinary Comments: Bladder is noted to be well support on exam     HENT:   Head: Normocephalic and atraumatic  Neck: Neck supple  No thyromegaly present  Cardiovascular: Normal rate, regular rhythm, S1 normal, S2 normal and normal heart sounds  Pulmonary/Chest: Effort normal and breath sounds normal  Right breast exhibits no inverted nipple, no mass, no nipple discharge, no skin change and no tenderness  Left breast exhibits no inverted nipple, no mass, no nipple discharge, no skin change and no tenderness  Abdominal: Soft  Bowel sounds are normal  She exhibits no distension and no mass  There is no tenderness  There is no guarding  Lymphadenopathy:     She has no cervical adenopathy  She has no axillary adenopathy  Neurological: She is alert  Skin: Skin is warm  Psychiatric: She has a normal mood and affect  Nursing note and vitals reviewed  Assessment and Plan    Tray Alvares was seen today for gynecologic exam     Diagnoses and all orders for this visit:    Women's annual routine gynecological examination    Stress incontinence of urine  -     Ambulatory referral to Urogynecology; Future      Patient informed of a Stable GYN exam  A pap smear was not performed due to a negative pap in 2018  I have discussed the importance of exercise and healthy diet as well as adequate intake of calcium and vitamin D  The current ASCCP guidelines were reviewed  The low risk patient will receive pap smear screening every 3 years until the age of 34 and then every 3 to 5 years with HPV co-testing from the ages of 33-67  I emphasized the importance of an annual pelvic and breast exam  A yearly mammogram is recommended for breast cancer screening starting at age 36  All questions have been answered to her satisfaction  Follow up in: 1 year

## 2020-09-01 ENCOUNTER — OFFICE VISIT (OUTPATIENT)
Dept: OBGYN CLINIC | Facility: CLINIC | Age: 40
End: 2020-09-01
Payer: COMMERCIAL

## 2020-09-01 VITALS
WEIGHT: 231 LBS | DIASTOLIC BLOOD PRESSURE: 78 MMHG | TEMPERATURE: 98.1 F | SYSTOLIC BLOOD PRESSURE: 118 MMHG | BODY MASS INDEX: 39.44 KG/M2 | HEIGHT: 64 IN

## 2020-09-01 DIAGNOSIS — N64.4 BREAST PAIN: Primary | ICD-10-CM

## 2020-09-01 PROCEDURE — 99213 OFFICE O/P EST LOW 20 MIN: CPT | Performed by: NURSE PRACTITIONER

## 2020-09-01 NOTE — PROGRESS NOTES
Rosio Howard 80-year-old  presents with complaint of left breast problem  She states "I feel like I have fluid in my left breast"  It's like something is moving inside the breast, "like when I was breast feeding"  Sometimes it feels like ants or electricity in the breast  There is pain on the left side behind the nipple  Denies changes in her skin  Denies nipple discharge  She has been seen for similar complaint of the left breast as well as for nipple discharge in the past  She was referred to Dr Courtney Moreno  In 2018 diagnostic mammogram and left breast US was benign - recommendation for breast MRI and clinical management  She did not continue to follow with Dr Courtney Moreno  ROS:  As indicated in HPI  All other ROS negative  Physical Exam  Constitutional:       Appearance: Normal appearance  HENT:      Head: Normocephalic and atraumatic  Neck:      Musculoskeletal: Neck supple  Chest:      Breasts: Breasts are symmetrical          Right: Normal  No swelling, bleeding, inverted nipple, mass, nipple discharge, skin change or tenderness  Left: Normal  No swelling, bleeding, inverted nipple, mass, nipple discharge, skin change or tenderness  Lymphadenopathy:      Upper Body:      Right upper body: No supraclavicular or axillary adenopathy  Left upper body: No supraclavicular or axillary adenopathy  Neurological:      Mental Status: She is alert  Skin:     General: Skin is warm and dry  Vitals signs and nursing note reviewed  Primo Aceves was seen today for breast pain  Diagnoses and all orders for this visit:    Breast pain  -     US breast left limited (diagnostic); Future  -     Mammo diagnostic bilateral w 3d & cad; Future       Will repeat diagnostic studies  Clinical management and further studies will depend of results  I will be in touch results and plan

## 2021-04-16 ENCOUNTER — HOSPITAL ENCOUNTER (EMERGENCY)
Facility: HOSPITAL | Age: 41
Discharge: HOME | End: 2021-04-16
Attending: EMERGENCY MEDICINE | Admitting: EMERGENCY MEDICINE
Payer: COMMERCIAL

## 2021-04-16 VITALS
HEART RATE: 73 BPM | BODY MASS INDEX: 37.76 KG/M2 | WEIGHT: 220 LBS | DIASTOLIC BLOOD PRESSURE: 77 MMHG | TEMPERATURE: 98 F | RESPIRATION RATE: 18 BRPM | OXYGEN SATURATION: 98 % | SYSTOLIC BLOOD PRESSURE: 144 MMHG

## 2021-04-16 DIAGNOSIS — M79.605 LEFT LEG PAIN: Primary | ICD-10-CM

## 2021-04-16 LAB
ANION GAP SERPL CALCULATED.3IONS-SCNC: 5 MMOL/L (ref 4–13)
BASOPHILS # BLD AUTO: 0.05 THOUSANDS/ΜL (ref 0–0.1)
BASOPHILS NFR BLD AUTO: 1 % (ref 0–1)
BUN SERPL-MCNC: 8 MG/DL (ref 5–25)
CALCIUM SERPL-MCNC: 8.5 MG/DL (ref 8.3–10.1)
CHLORIDE SERPL-SCNC: 112 MMOL/L (ref 100–108)
CO2 SERPL-SCNC: 24 MMOL/L (ref 21–32)
CREAT SERPL-MCNC: 0.74 MG/DL (ref 0.6–1.3)
D DIMER PPP FEU-MCNC: 0.29 UG/ML FEU
EOSINOPHIL # BLD AUTO: 0.17 THOUSAND/ΜL (ref 0–0.61)
EOSINOPHIL NFR BLD AUTO: 2 % (ref 0–6)
ERYTHROCYTE [DISTWIDTH] IN BLOOD BY AUTOMATED COUNT: 12.7 % (ref 11.6–15.1)
GFR SERPL CREATININE-BSD FRML MDRD: 101 ML/MIN/1.73SQ M
GLUCOSE SERPL-MCNC: 82 MG/DL (ref 65–140)
HCT VFR BLD AUTO: 40.1 % (ref 34.8–46.1)
HGB BLD-MCNC: 12.8 G/DL (ref 11.5–15.4)
IMM GRANULOCYTES # BLD AUTO: 0.02 THOUSAND/UL (ref 0–0.2)
IMM GRANULOCYTES NFR BLD AUTO: 0 % (ref 0–2)
LYMPHOCYTES # BLD AUTO: 2.11 THOUSANDS/ΜL (ref 0.6–4.47)
LYMPHOCYTES NFR BLD AUTO: 29 % (ref 14–44)
MCH RBC QN AUTO: 29.7 PG (ref 26.8–34.3)
MCHC RBC AUTO-ENTMCNC: 31.9 G/DL (ref 31.4–37.4)
MCV RBC AUTO: 93 FL (ref 82–98)
MONOCYTES # BLD AUTO: 0.51 THOUSAND/ΜL (ref 0.17–1.22)
MONOCYTES NFR BLD AUTO: 7 % (ref 4–12)
NEUTROPHILS # BLD AUTO: 4.51 THOUSANDS/ΜL (ref 1.85–7.62)
NEUTS SEG NFR BLD AUTO: 61 % (ref 43–75)
NRBC BLD AUTO-RTO: 0 /100 WBCS
PLATELET # BLD AUTO: 277 THOUSANDS/UL (ref 149–390)
PMV BLD AUTO: 10.4 FL (ref 8.9–12.7)
POTASSIUM SERPL-SCNC: 4 MMOL/L (ref 3.5–5.3)
RBC # BLD AUTO: 4.31 MILLION/UL (ref 3.81–5.12)
SODIUM SERPL-SCNC: 141 MMOL/L (ref 136–145)
WBC # BLD AUTO: 7.37 THOUSAND/UL (ref 4.31–10.16)

## 2021-04-16 PROCEDURE — 36415 COLL VENOUS BLD VENIPUNCTURE: CPT | Performed by: EMERGENCY MEDICINE

## 2021-04-16 PROCEDURE — 80048 BASIC METABOLIC PNL TOTAL CA: CPT | Performed by: EMERGENCY MEDICINE

## 2021-04-16 PROCEDURE — 99284 EMERGENCY DEPT VISIT MOD MDM: CPT | Performed by: EMERGENCY MEDICINE

## 2021-04-16 PROCEDURE — 85379 FIBRIN DEGRADATION QUANT: CPT | Performed by: EMERGENCY MEDICINE

## 2021-04-16 PROCEDURE — 85025 COMPLETE CBC W/AUTO DIFF WBC: CPT | Performed by: EMERGENCY MEDICINE

## 2021-04-16 PROCEDURE — 99283 EMERGENCY DEPT VISIT LOW MDM: CPT

## 2021-04-16 NOTE — DISCHARGE INSTRUCTIONS
You have been seen for left leg pain  Please take tylenol as needed for pain  Return to the emergency department if you develop worsening pain, weakness, swelling or any other symptoms of concern  Please follow up with your PCP by calling the number provided

## 2021-04-16 NOTE — ED PROVIDER NOTES
History  Chief Complaint   Patient presents with    Leg Pain     Pt stated that she received her Cleave Darrell and Cleave Darrell COVID vaccine 6 days ago and now noticed some bruising on her L lower leg, and her R arm  She also stated that she has "pins and needles" in her left leg and cramping  She denies any SOB  She also stated that she took baby ASA last night and the day prior  ,     42-year-old female history of obesity, presenting due to leg pain  Patient states that she received her Cleave Darrell and Efrain's vaccine around 1 week ago and this morning when she woke up she noted that she had some left upper leg pain and bruising and she is concerned of recent new story she read about the vaccine potentially causing blood clot  States that the leg pain feels like an aching sensation in her upper leg  Says that she also has some achiness in both of her arms  Denies any history of blood clots such as DVT or PE  Denies any recent travel or our ability or recent surgeries, denies any hormonal therapy  Denies any chest pain or shortness of breath or hemoptysis  Prior to Admission Medications   Prescriptions Last Dose Informant Patient Reported?  Taking?   fluticasone (FLONASE) 50 mcg/act nasal spray  Self Yes No   Si-2 sprays into each nostril daily   meclizine (ANTIVERT) 12 5 MG tablet   No No   Sig: Take 1 tablet (12 5 mg total) by mouth 3 (three) times a day as needed for dizziness      Facility-Administered Medications: None       Past Medical History:   Diagnosis Date    Breast lump     Disease of thyroid gland     Migraine     Morbid obesity with body mass index of 45 0-49 9 in adult ([facility] [address] )     Nephrolithiasis     Sleep apnea     Spontaneous         Past Surgical History:   Procedure Laterality Date    BARIATRIC SURGERY      lap band    INSERTION OF CONTRACEPTIVE CAPSULE      LAPAROSCOPY N/A 2016    Procedure: BILATERAL LAPAROSCOPIC TUBAL LIGATION; REMOVAL OF IMPLANON FROM LEFT ARM ; Surgeon: Sendy Ayala MD;  Location: [facility];  Service:     LITHOTRIPSY  2005    TONSILLECTOMY  2000    TUBAL LIGATION         Family History   Problem Relation Age of Onset    Skin cancer Mother     Hypertension Mother     Goiter Mother     Hypertension Father     Other Family         Tetralogy of fallot    Hyperlipidemia Family      I have reviewed and agree with the history as documented  E-Cigarette/Vaping    E-Cigarette Use Never User      E-Cigarette/Vaping Substances     Social History     Tobacco Use    Smoking status: Never Smoker    Smokeless tobacco: Never Used   Substance Use Topics    Alcohol use: No    Drug use: No        Review of Systems   Constitutional: Negative for chills and fever  HENT: Negative for sore throat  Eyes: Negative for visual disturbance  Respiratory: Negative for cough, chest tightness and shortness of breath  Cardiovascular: Negative for chest pain and palpitations  Gastrointestinal: Negative for abdominal pain and vomiting  Genitourinary: Negative for dysuria and hematuria  Musculoskeletal: Negative for arthralgias and back pain  Left leg pain   Skin: Negative for color change and rash  Scattered small bruises left lower extremity bilateral forearms   Neurological: Negative for syncope  All other systems reviewed and are negative  Physical Exam  ED Triage Vitals [04/16/21 0600]   Temperature Pulse Respirations Blood Pressure SpO2   98 °F (36 7 °C) 73 18 144/77 98 %      Temp Source Heart Rate Source Patient Position - Orthostatic VS BP Location FiO2 (%)   Oral -- -- -- --      Pain Score       --             Orthostatic Vital Signs  Vitals:    04/16/21 0600   BP: 144/77   Pulse: 73       Physical Exam  Vitals signs and nursing note reviewed  Constitutional:       General: She is not in acute distress  Appearance: She is well-developed  HENT:      Head: Normocephalic and atraumatic     Eyes:      Conjunctiva/sclera: Conjunctivae normal    Neck:      Musculoskeletal: Neck supple  Cardiovascular:      Rate and Rhythm: Normal rate and regular rhythm  Heart sounds: No murmur  Pulmonary:      Effort: Pulmonary effort is normal  No respiratory distress  Breath sounds: Normal breath sounds  Abdominal:      Palpations: Abdomen is soft  Tenderness: There is no abdominal tenderness  Musculoskeletal:         General: No swelling, tenderness, deformity or signs of injury  Right lower leg: No edema  Left lower leg: No edema  Comments: Small scattered bruising various ages on left shin, bilateral forearms  No induration or erythema or signs of abscess or infection  Skin:     General: Skin is warm and dry  Neurological:      Mental Status: She is alert and oriented to person, place, and time     Psychiatric:         Mood and Affect: Mood normal          ED Medications  Medications - No data to display    Diagnostic Studies  Results Reviewed     Procedure Component Value Units Date/Time    D-dimer, quantitative [[id number]]  (Normal) Collected: 04/16/21 0618    Lab Status: Final result Specimen: Blood from Arm, Left Updated: 04/16/21 0743     D-Dimer, Quant 0 29 ug/ml FEU     Basic metabolic panel [[id number]]  (Abnormal) Collected: 04/16/21 0618    Lab Status: Final result Specimen: Blood from Arm, Left Updated: 04/16/21 0725     Sodium 141 mmol/L      Potassium 4 0 mmol/L      Chloride 112 mmol/L      CO2 24 mmol/L      ANION GAP 5 mmol/L      BUN 8 mg/dL      Creatinine 0 74 mg/dL      Glucose 82 mg/dL      Calcium 8 5 mg/dL      eGFR 101 ml/min/1 73sq m     Narrative:      [place] guidelines for Chronic Kidney Disease (CKD):     Stage 1 with normal or high GFR (GFR > 90 mL/min/1 73 square meters)    Stage 2 Mild CKD (GFR = 60-89 mL/min/1 73 square meters)    Stage 3A Moderate CKD (GFR = 45-59 mL/min/1 73 square meters)    Stage 3B Moderate CKD (GFR = 30-44 mL/min/1 73 square meters)    Stage 4 Severe CKD (GFR = 15-29 mL/min/1 73 square meters)    Stage 5 End Stage CKD (GFR <15 mL/min/1 73 square meters)  Note: GFR calculation is accurate only with a steady state creatinine    CBC and differential [[id number]] Collected: 04/16/21 0618    Lab Status: Final result Specimen: Blood from Arm, Left Updated: 04/16/21 0706     WBC 7 37 Thousand/uL      RBC 4 31 Million/uL      Hemoglobin 12 8 g/dL      Hematocrit 40 1 %      MCV 93 fL      MCH 29 7 pg      MCHC 31 9 g/dL      RDW 12 7 %      MPV 10 4 fL      Platelets 457 Thousands/uL      nRBC 0 /100 WBCs      Neutrophils Relative 61 %      Immat GRANS % 0 %      Lymphocytes Relative 29 %      Monocytes Relative 7 %      Eosinophils Relative 2 %      Basophils Relative 1 %      Neutrophils Absolute 4 51 Thousands/µL      Immature Grans Absolute 0 02 Thousand/uL      Lymphocytes Absolute 2 11 Thousands/µL      Monocytes Absolute 0 51 Thousand/µL      Eosinophils Absolute 0 17 Thousand/µL      Basophils Absolute 0 05 Thousands/µL                  No orders to display         Procedures  Procedures      ED Course  ED Course as of Apr 16 2031 Fri Apr 16, 2021   0289 SO- J&J vaccine  Concerned she has clot bc woke up w leg pain  Can d/c after negative dimer                                          MDM  Number of Diagnoses or Management Options  Left leg pain:   Diagnosis management comments: Basic labs and dimer ordered  Low suspicion for DVT based on presentation  Patient concerned based on recent use reports of increased risk of clot with J and J vaccine  Dimer negative  Patient reassured and will follow-up with PCP    Return precautions advised      Disposition  Final diagnoses:   Left leg pain     Time reflects when diagnosis was documented in both MDM as applicable and the Disposition within this note     Time User Action Codes Description Comment    4/16/2021  7:49 NIYA Avila Add [M79 605] Left leg pain       ED Disposition     ED Disposition Condition Date/Time Comment    Discharge Stable Fri Apr 16, 2021  7:49 AM Jacklyn Acharya discharge to home/self care  Follow-up Information     Follow up With Specialties Details Why Myles6 Lakhwinder Crenshaw Physician Assistant Call  For reevaluation if symptoms do not resolve  [address] [place] [address],[address]  IGNACIO Linda   [phone]            Discharge Medication List as of 4/16/2021  7:50 AM      CONTINUE these medications which have NOT CHANGED    Details   fluticasone (FLONASE) 50 mcg/act nasal spray 1-2 sprays into each nostril daily, Starting Wed 4/27/2016, Historical Med      meclizine (ANTIVERT) 12 5 MG tablet Take 1 tablet (12 5 mg total) by mouth 3 (three) times a day as needed for dizziness, Starting Fri 11/30/2018, Print           No discharge procedures on file  PDMP Review     None           ED Provider  Attending physically available and evaluated Jacklyn Acharya  CHICHI managed the patient along with the ED Attending      Electronically Signed by         Karla Vazquez DO  04/16/21 2031

## 2021-04-16 NOTE — ED ATTENDING ATTESTATION
4/16/2021  I, Nora Mullen MD, saw and evaluated the patient  I have discussed the patient with the resident/non-physician practitioner and agree with the resident's/non-physician practitioner's findings, Plan of Care, and MDM as documented in the resident's/non-physician practitioner's note, except where noted  All available labs and Radiology studies were reviewed  I was present for key portions of any procedure(s) performed by the resident/non-physician practitioner and I was immediately available to provide assistance  At this point I agree with the current assessment done in the Emergency Department  I have conducted an independent evaluation of this patient a history and physical is as follows:    Recently received covid vaccine, now with pain in the LUE and LLE  Has bruising noted in 2 or 3 spots  No known trauma  No HA, dizziness, CP or dyspnea  VS and nursing notes reviewed  General: Appears in NAD, awake, alert, speaking normally in full sentences  Well-nourished, well-developed  Appears stated age  Head: Normocephalic, atraumatic  Eyes: EOMI  Vision grossly normal  No subconjunctival hemorrhages or occular discharge noted  Symmetrical lids  ENT: Atraumatic external nose and ears  No stridor  Normal phonation  No drooling  Normal swallowing  Neck: No JVD  FROM  No goiter  CV: No pallor  Normal rate  Lungs: No tachypnea  No respiratory distress  MSK: Moving all extremities equally, no peripheral edema  Skin: Dry, intact  No cyanosis  , Bruising anterior L shin, L humeral area, medial  Neuro: Awake, alert, GCS15  CN II-XII grossly intact  Grossly normal gait  Psychiatric/Behavioral: Appropriate mood and affect  A/P: This is a 39 y o  female who presents to the ED for evaluation of bruising  Given recent vaccination  Labs  Reevaluate and dispo accordingly      ED Course         Critical Care Time  Procedures

## 2021-04-16 NOTE — Clinical Note
Rosio Howard was seen and treated in our emergency department on 4/16/2021  Diagnosis: Leg pain    Primo Aceves  is off the rest of the shift today  She may return on this date: If you have any questions or concerns, please don't hesitate to call        Vel Marsh,     ______________________________           _______________          _______________  Hospital Representative                              Date                                Time